# Patient Record
Sex: FEMALE | Race: ASIAN | NOT HISPANIC OR LATINO | URBAN - METROPOLITAN AREA
[De-identification: names, ages, dates, MRNs, and addresses within clinical notes are randomized per-mention and may not be internally consistent; named-entity substitution may affect disease eponyms.]

---

## 2023-03-09 ENCOUNTER — OUTPATIENT (OUTPATIENT)
Dept: INPATIENT UNIT | Facility: HOSPITAL | Age: 23
LOS: 1 days | Discharge: HOME | End: 2023-03-09
Payer: MEDICAID

## 2023-03-09 VITALS
SYSTOLIC BLOOD PRESSURE: 104 MMHG | RESPIRATION RATE: 18 BRPM | TEMPERATURE: 100 F | DIASTOLIC BLOOD PRESSURE: 58 MMHG | HEART RATE: 98 BPM

## 2023-03-09 DIAGNOSIS — Z98.890 OTHER SPECIFIED POSTPROCEDURAL STATES: Chronic | ICD-10-CM

## 2023-03-09 DIAGNOSIS — O26.899 OTHER SPECIFIED PREGNANCY RELATED CONDITIONS, UNSPECIFIED TRIMESTER: ICD-10-CM

## 2023-03-09 DIAGNOSIS — R11.10 VOMITING, UNSPECIFIED: ICD-10-CM

## 2023-03-09 LAB
ALBUMIN SERPL ELPH-MCNC: 3.9 G/DL — SIGNIFICANT CHANGE UP (ref 3.5–5.2)
ALP SERPL-CCNC: 64 U/L — SIGNIFICANT CHANGE UP (ref 30–115)
ALT FLD-CCNC: 10 U/L — SIGNIFICANT CHANGE UP (ref 0–41)
AMYLASE P1 CFR SERPL: 76 U/L — SIGNIFICANT CHANGE UP (ref 25–115)
ANION GAP SERPL CALC-SCNC: 13 MMOL/L — SIGNIFICANT CHANGE UP (ref 7–14)
AST SERPL-CCNC: 23 U/L — SIGNIFICANT CHANGE UP (ref 0–41)
BASOPHILS # BLD AUTO: 0.02 K/UL — SIGNIFICANT CHANGE UP (ref 0–0.2)
BASOPHILS NFR BLD AUTO: 0.1 % — SIGNIFICANT CHANGE UP (ref 0–1)
BILIRUB SERPL-MCNC: 0.4 MG/DL — SIGNIFICANT CHANGE UP (ref 0.2–1.2)
BUN SERPL-MCNC: 10 MG/DL — SIGNIFICANT CHANGE UP (ref 10–20)
CALCIUM SERPL-MCNC: 8.2 MG/DL — LOW (ref 8.4–10.4)
CHLORIDE SERPL-SCNC: 103 MMOL/L — SIGNIFICANT CHANGE UP (ref 98–110)
CO2 SERPL-SCNC: 21 MMOL/L — SIGNIFICANT CHANGE UP (ref 17–32)
CREAT SERPL-MCNC: <0.5 MG/DL — LOW (ref 0.7–1.5)
EGFR: 142 ML/MIN/1.73M2 — SIGNIFICANT CHANGE UP
EOSINOPHIL # BLD AUTO: 0 K/UL — SIGNIFICANT CHANGE UP (ref 0–0.7)
EOSINOPHIL NFR BLD AUTO: 0 % — SIGNIFICANT CHANGE UP (ref 0–8)
GLUCOSE SERPL-MCNC: 78 MG/DL — SIGNIFICANT CHANGE UP (ref 70–99)
HCT VFR BLD CALC: 37.7 % — SIGNIFICANT CHANGE UP (ref 37–47)
HGB BLD-MCNC: 12.9 G/DL — SIGNIFICANT CHANGE UP (ref 12–16)
IMM GRANULOCYTES NFR BLD AUTO: 0.6 % — HIGH (ref 0.1–0.3)
LIDOCAIN IGE QN: 15 U/L — SIGNIFICANT CHANGE UP (ref 7–60)
LYMPHOCYTES # BLD AUTO: 0.61 K/UL — LOW (ref 1.2–3.4)
LYMPHOCYTES # BLD AUTO: 4.5 % — LOW (ref 20.5–51.1)
MCHC RBC-ENTMCNC: 31.9 PG — HIGH (ref 27–31)
MCHC RBC-ENTMCNC: 34.2 G/DL — SIGNIFICANT CHANGE UP (ref 32–37)
MCV RBC AUTO: 93.3 FL — SIGNIFICANT CHANGE UP (ref 81–99)
MONOCYTES # BLD AUTO: 0.41 K/UL — SIGNIFICANT CHANGE UP (ref 0.1–0.6)
MONOCYTES NFR BLD AUTO: 3 % — SIGNIFICANT CHANGE UP (ref 1.7–9.3)
NEUTROPHILS # BLD AUTO: 12.42 K/UL — HIGH (ref 1.4–6.5)
NEUTROPHILS NFR BLD AUTO: 91.8 % — HIGH (ref 42.2–75.2)
NRBC # BLD: 0 /100 WBCS — SIGNIFICANT CHANGE UP (ref 0–0)
PLATELET # BLD AUTO: 154 K/UL — SIGNIFICANT CHANGE UP (ref 130–400)
POTASSIUM SERPL-MCNC: 4 MMOL/L — SIGNIFICANT CHANGE UP (ref 3.5–5)
POTASSIUM SERPL-SCNC: 4 MMOL/L — SIGNIFICANT CHANGE UP (ref 3.5–5)
PROT SERPL-MCNC: 6.5 G/DL — SIGNIFICANT CHANGE UP (ref 6–8)
RBC # BLD: 4.04 M/UL — LOW (ref 4.2–5.4)
RBC # FLD: 12.7 % — SIGNIFICANT CHANGE UP (ref 11.5–14.5)
SODIUM SERPL-SCNC: 137 MMOL/L — SIGNIFICANT CHANGE UP (ref 135–146)
WBC # BLD: 13.54 K/UL — HIGH (ref 4.8–10.8)
WBC # FLD AUTO: 13.54 K/UL — HIGH (ref 4.8–10.8)

## 2023-03-09 PROCEDURE — 80053 COMPREHEN METABOLIC PANEL: CPT

## 2023-03-09 PROCEDURE — 99417 PROLNG OP E/M EACH 15 MIN: CPT | Mod: 25

## 2023-03-09 PROCEDURE — 85025 COMPLETE CBC W/AUTO DIFF WBC: CPT

## 2023-03-09 PROCEDURE — 76815 OB US LIMITED FETUS(S): CPT | Mod: 26

## 2023-03-09 PROCEDURE — 36415 COLL VENOUS BLD VENIPUNCTURE: CPT

## 2023-03-09 PROCEDURE — 82150 ASSAY OF AMYLASE: CPT

## 2023-03-09 PROCEDURE — 99215 OFFICE O/P EST HI 40 MIN: CPT | Mod: TH,25

## 2023-03-09 PROCEDURE — 83690 ASSAY OF LIPASE: CPT

## 2023-03-09 PROCEDURE — 99214 OFFICE O/P EST MOD 30 MIN: CPT

## 2023-03-09 PROCEDURE — 59025 FETAL NON-STRESS TEST: CPT | Mod: 26

## 2023-03-09 PROCEDURE — 59025 FETAL NON-STRESS TEST: CPT

## 2023-03-09 RX ORDER — ONDANSETRON 8 MG/1
1 TABLET, FILM COATED ORAL
Qty: 6 | Refills: 0
Start: 2023-03-09 | End: 2023-03-10

## 2023-03-09 RX ORDER — ONDANSETRON 8 MG/1
4 TABLET, FILM COATED ORAL ONCE
Refills: 0 | Status: COMPLETED | OUTPATIENT
Start: 2023-03-09 | End: 2023-03-09

## 2023-03-09 RX ADMIN — ONDANSETRON 4 MILLIGRAM(S): 8 TABLET, FILM COATED ORAL at 15:40
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
## 2023-03-09 NOTE — OB PROVIDER TRIAGE NOTE - NSHPPHYSICALEXAM_GEN_ALL_CORE
T(C): 37.5 (03-09-23 @ 15:11), Max: 37.5 (03-09-23 @ 15:11)  HR: 98 (03-09-23 @ 15:11) (98 - 98)  BP: 104/58 (03-09-23 @ 15:11) (104/58 - 104/58)  RR: 18 (03-09-23 @ 15:11) (18 - 18)    EFM: 155 bpm, moderate variability, +accels  TOCO: none    Gen: Alert and Oriented x 3, no acute distress  Cardio: S1 and S2 present. No rubs, gallops, or murmurs present  Lungs: Clear to ausculation bilaterally  Abd: soft, gravid, nontender, +bowel sounds
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
## 2023-03-09 NOTE — OB PROVIDER TRIAGE NOTE - NSOBPROVIDERNOTE_OBGYN_ALL_OB_FT
22y  @ 26.3 weeks, GBS unknown, gastroenteritis, not in  labor.    -Zofran 4 mg IVP  -IVf, labs (CBC, CMP, Amylase, Lipase, T&S)  -Discharged Home  -Labor precautions reviewed  -PO Hydration encouraged  -Advised to count fetal kick counts  -Follow up with your scheduled OB visit    Dr. Louis blandon 22y  @ 26.3 weeks, GBS unknown, gastroenteritis, not in  labor.    -Zofran 4 mg IVP  -IVf, labs (CBC, CMP, Amylase, Lipase, T&S)  -Discharged Home  -BRAT Diet  -Zofran 4 mg PO every 8 hours as needed. Sent to Pharmacy  - Labor precautions reviewed  -PO Hydration encouraged  -Advised to count fetal kick counts  -Follow up with your scheduled OB visit on     Dr. Myers aware

## 2023-03-09 NOTE — OB PROVIDER TRIAGE NOTE - ADDITIONAL INSTRUCTIONS
-Zofran 4 mg IVP  -IVf, labs (CBC, CMP, Amylase, Lipase, T&S)  -Discharged Home  -BRAT Diet  -Zofran 4 mg PO every 8 hours as needed. Sent to Pharmacy  - Labor precautions reviewed  -PO Hydration encouraged  -Advised to count fetal kick counts  -Follow up with your scheduled OB visit on

## 2023-03-09 NOTE — OB PROVIDER TRIAGE NOTE - ATTENDING COMMENTS
PT seen and examined in triage.  Cared for patient from 15:15-17:00 (105 minutes).  this includes taking a history, conducting a physical exam, reviewing labs and continuous FHR tracing and interpretation, as well as sonogram.    Impression: Acute gastroenteritis @ 26.3 weeks  Plan: D/C home, BRAT diet, Zofran prn, F/U with PCP as scheduled.    Pt verbalized understanding of all instructions.  All questions answered.

## 2025-02-28 ENCOUNTER — EMERGENCY (EMERGENCY)
Facility: HOSPITAL | Age: 25
LOS: 0 days | Discharge: HOME | End: 2025-02-28
Attending: EMERGENCY MEDICINE
Payer: COMMERCIAL

## 2025-02-28 VITALS
OXYGEN SATURATION: 98 % | DIASTOLIC BLOOD PRESSURE: 82 MMHG | SYSTOLIC BLOOD PRESSURE: 126 MMHG | RESPIRATION RATE: 18 BRPM | TEMPERATURE: 98 F | WEIGHT: 115.08 LBS | HEART RATE: 103 BPM | HEIGHT: 62 IN

## 2025-02-28 DIAGNOSIS — S00.93XA CONTUSION OF UNSPECIFIED PART OF HEAD, INITIAL ENCOUNTER: ICD-10-CM

## 2025-02-28 DIAGNOSIS — S10.93XA CONTUSION OF UNSPECIFIED PART OF NECK, INITIAL ENCOUNTER: ICD-10-CM
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
## 2025-02-28 DIAGNOSIS — Z98.890 OTHER SPECIFIED POSTPROCEDURAL STATES: Chronic | ICD-10-CM

## 2025-02-28 DIAGNOSIS — R11.0 NAUSEA: ICD-10-CM

## 2025-02-28 DIAGNOSIS — R51.9 HEADACHE, UNSPECIFIED: ICD-10-CM
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
## 2025-02-28 PROBLEM — Z78.9 OTHER SPECIFIED HEALTH STATUS: Chronic | Status: ACTIVE | Noted: 2023-03-09

## 2025-02-28 PROCEDURE — 99284 EMERGENCY DEPT VISIT MOD MDM: CPT | Mod: 25

## 2025-02-28 PROCEDURE — 70450 CT HEAD/BRAIN W/O DYE: CPT | Mod: MC

## 2025-02-28 PROCEDURE — 70450 CT HEAD/BRAIN W/O DYE: CPT | Mod: 26

## 2025-02-28 RX ORDER — ONDANSETRON 4 MG/1
4 TABLET, ORALLY DISINTEGRATING ORAL ONCE
Refills: 0 | Status: COMPLETED | OUTPATIENT
Start: 2025-02-28 | End: 2025-02-28

## 2025-02-28 RX ORDER — ACETAMINOPHEN 160 MG/5ML
650 SUSPENSION ORAL ONCE
Refills: 0 | Status: COMPLETED | OUTPATIENT
Start: 2025-02-28 | End: 2025-02-28

## 2025-02-28 RX ADMIN — ACETAMINOPHEN 650 MILLIGRAM(S): 160 SUSPENSION ORAL at 05:53

## 2025-02-28 RX ADMIN — ONDANSETRON 4 MILLIGRAM(S): 4 TABLET, ORALLY DISINTEGRATING ORAL at 05:53
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
## 2025-02-28 NOTE — ED PROVIDER NOTE - PHYSICAL EXAMINATION
VITAL SIGNS: I have reviewed nursing notes and confirm.  CONSTITUTIONAL: well-appearing, non-toxic, NAD  SKIN: Warm dry.   HEAD: NCAT  EYES: EOMI, PERRLA, no scleral icterus  ENT: Moist mucous membranes  NECK: Supple; non tender.  CARD: RRR, no murmurs, rubs or gallops  RESP: clear to ausculation b/l.  No rales, rhonchi, or wheezing.  ABD: soft, non-tender, non-distended, no rebound or guarding. No CVA tenderness  EXT: Full ROM, no bony tenderness, no pedal edema, no calf tenderness  NEURO: Grossly intact. Normal gait.  PSYCH: Cooperative, appropriate. VITAL SIGNS: I have reviewed nursing notes and confirm.  CONSTITUTIONAL: well-appearing, non-toxic, NAD  SKIN: Warm dry.   HEAD: Normocephalic. + bruising to forehead  EYES: EOMI, PERRLA, no scleral icterus  ENT: Moist mucous membranes  NECK: Supple; non tender. + bruising to neck.  CARD: RRR, no murmurs, rubs or gallops  RESP: clear to ausculation b/l.    ABD: soft, non-tender, non-distended.  EXT: Full ROM  NEURO: Grossly intact. Normal gait.  PSYCH: Cooperative, appropriate.

## 2025-02-28 NOTE — ED PROVIDER NOTE - PATIENT PORTAL LINK FT
You can access the FollowMyHealth Patient Portal offered by [facility] by registering at the following website: [email or web address]. By joining [facility]’s FollowMyHealth portal, you will also be able to view your health information using other applications (apps) compatible with our system.

## 2025-02-28 NOTE — ED ADULT NURSE NOTE - NSFALLUNIVINTERV_ED_ALL_ED
Bed/Stretcher in lowest position, wheels locked, appropriate side rails in place/Call bell, personal items and telephone in reach/Instruct patient to call for assistance before getting out of bed/chair/stretcher/Non-slip footwear applied when patient is off stretcher/[place] to call system/Physically safe environment - no spills, clutter or unnecessary equipment/Purposeful proactive rounding/Room/bathroom lighting operational, light cord in reach

## 2025-05-02 NOTE — ED ADULT NURSE NOTE - NSFALLRISK_ED_ALL_ED
Assessment & Plan       Lumbar radiculopathy  Patient is experiencing lumbar radiculopathy which is consistent with an L5 distribution.  Will have patient get an CHARITY to see if this helps relieve symptoms.  - MRI LUMBAR SPINE W/O CONTRAST; Future  - gabapentin (NEURONTIN) 300 MG capsule; Take 1 capsule (300 mg) by mouth daily.  - Pain Management  Referral; Future    Moderate persistent asthma without complication  ACT score of 21  - albuterol (PROAIR HFA/PROVENTIL HFA/VENTOLIN HFA) 108 (90 Base) MCG/ACT inhaler; Inhale 1-2 puffs into the lungs every 6 hours as needed for shortness of breath, wheezing or cough.  - budesonide-formoterol (SYMBICORT/BREYNA) 160-4.5 MCG/ACT Inhaler; Inhale 2 puffs into the lungs 2 times daily.  - ipratropium - albuterol 0.5 mg/2.5 mg/3 mL (DUONEB) 0.5-2.5 (3) MG/3ML neb solution; Take 1 vial (3 mLs) by nebulization 2 times daily.  - montelukast (SINGULAIR) 10 MG tablet; Take 1 tablet (10 mg) by mouth at bedtime.    Elevated IgE level  - montelukast (SINGULAIR) 10 MG tablet; Take 1 tablet (10 mg) by mouth at bedtime.                Subjective   Lara is a 76 year old, presenting for the following health issues:  Other (Follow up MRI /And legs )    HPI            Patient is here to follow-up on her MRI of her brain.  This was completed because her EMG showed an L5 radiculopathy but the neurologist noted that it could be NPH.  Patient did not have any other specific symptoms for NPH but due to specialist recommendation the MRI of the brain was completed.  The MRI was normal.  Patient symptoms have continued shooting down her leg in an L5 distribution.        Objective    BP (!) 167/90   Pulse 71   Temp 97.3  F (36.3  C) (Oral)   Resp 16   Wt 51.3 kg (113 lb 3.2 oz)   SpO2 98%   BMI 20.70 kg/m    Body mass index is 20.7 kg/m .  Physical Exam  HENT:      Head: Normocephalic.      Nose: Nose normal.      Mouth/Throat:      Mouth: Mucous membranes are moist.   Eyes:       Extraocular Movements: Extraocular movements intact.      Conjunctiva/sclera: Conjunctivae normal.   Cardiovascular:      Rate and Rhythm: Normal rate and regular rhythm.   Pulmonary:      Effort: Pulmonary effort is normal.      Breath sounds: Normal breath sounds.   Abdominal:      Palpations: Abdomen is soft.   Musculoskeletal:      Cervical back: Normal range of motion.   Skin:     General: Skin is warm.      Capillary Refill: Capillary refill takes less than 2 seconds.   Neurological:      Mental Status: She is alert.      Comments: Decree sensation in the extremity L5 distribution.                     Signed Electronically by: Neftali Gibbs MD     No